# Patient Record
Sex: FEMALE | URBAN - METROPOLITAN AREA
[De-identification: names, ages, dates, MRNs, and addresses within clinical notes are randomized per-mention and may not be internally consistent; named-entity substitution may affect disease eponyms.]

---

## 2017-01-12 ENCOUNTER — APPOINTMENT (RX ONLY)
Dept: URBAN - METROPOLITAN AREA CLINIC 361 | Facility: CLINIC | Age: 32
Setting detail: DERMATOLOGY
End: 2017-01-12

## 2017-01-12 DIAGNOSIS — L663 OTHER SPECIFIED DISEASES OF HAIR AND HAIR FOLLICLES: ICD-10-CM

## 2017-01-12 DIAGNOSIS — L738 OTHER SPECIFIED DISEASES OF HAIR AND HAIR FOLLICLES: ICD-10-CM

## 2017-01-12 DIAGNOSIS — R52 PAIN, UNSPECIFIED: ICD-10-CM

## 2017-01-12 DIAGNOSIS — L73.9 FOLLICULAR DISORDER, UNSPECIFIED: ICD-10-CM

## 2017-01-12 PROBLEM — D48.5 NEOPLASM OF UNCERTAIN BEHAVIOR OF SKIN: Status: ACTIVE | Noted: 2017-01-12

## 2017-01-12 PROBLEM — G40.909 EPILEPSY, UNSPECIFIED, NOT INTRACTABLE, WITHOUT STATUS EPILEPTICUS: Status: ACTIVE | Noted: 2017-01-12

## 2017-01-12 PROBLEM — L02.821 FURUNCLE OF HEAD [ANY PART, EXCEPT FACE]: Status: ACTIVE | Noted: 2017-01-12

## 2017-01-12 PROCEDURE — ? PRESCRIPTION

## 2017-01-12 PROCEDURE — 99212 OFFICE O/P EST SF 10 MIN: CPT

## 2017-01-12 PROCEDURE — ? COUNSELING

## 2017-01-12 PROCEDURE — ? TREATMENT REGIMEN

## 2017-01-12 RX ORDER — DOXYCYCLINE 40 MG/1
CAPSULE ORAL
Qty: 30 | Refills: 2 | Status: ERX | COMMUNITY
Start: 2017-01-12

## 2017-01-12 RX ORDER — DOXYCYCLINE HYCLATE 100 MG/1
CAPSULE, GELATIN COATED ORAL
Qty: 28 | Refills: 0 | Status: ERX | COMMUNITY
Start: 2017-01-12

## 2017-01-12 RX ORDER — SULFACETAMIDE SODIUM 100 MG/ML
SHAMPOO TOPICAL
Qty: 1 | Refills: 3 | Status: ERX | COMMUNITY
Start: 2017-01-12

## 2017-01-12 RX ADMIN — DOXYCYCLINE: 40 CAPSULE ORAL at 15:59

## 2017-01-12 RX ADMIN — DOXYCYCLINE HYCLATE: 100 CAPSULE, GELATIN COATED ORAL at 15:59

## 2017-01-12 RX ADMIN — SULFACETAMIDE SODIUM: 100 SHAMPOO TOPICAL at 15:59

## 2017-01-12 ASSESSMENT — LOCATION SIMPLE DESCRIPTION DERM: LOCATION SIMPLE: POSTERIOR SCALP

## 2017-01-12 ASSESSMENT — SEVERITY ASSESSMENT: SEVERITY: MODERATE TO SEVERE

## 2017-01-12 ASSESSMENT — LOCATION ZONE DERM: LOCATION ZONE: SCALP

## 2017-01-12 ASSESSMENT — PAIN INTENSITY VAS: HOW INTENSE IS YOUR PAIN 0 BEING NO PAIN, 10 BEING THE MOST SEVERE PAIN POSSIBLE?: NO PAIN

## 2017-01-12 ASSESSMENT — LOCATION DETAILED DESCRIPTION DERM: LOCATION DETAILED: POSTERIOR MID-PARIETAL SCALP

## 2017-01-12 NOTE — HPI: RASH
Is This A New Presentation, Or A Follow-Up?: Rash
Additional History: The patient has taken two courses of 100mg doxy, the most recent ended a week ago. The patient now has lymphadenopathy.

## 2017-02-10 RX ORDER — SULFACETAMIDE SODIUM 100 MG/ML
SHAMPOO TOPICAL
Qty: 1 | Refills: 3 | Status: ERX